# Patient Record
Sex: FEMALE | Race: WHITE | NOT HISPANIC OR LATINO | Employment: OTHER | URBAN - METROPOLITAN AREA
[De-identification: names, ages, dates, MRNs, and addresses within clinical notes are randomized per-mention and may not be internally consistent; named-entity substitution may affect disease eponyms.]

---

## 2024-07-29 ENCOUNTER — ANNUAL EXAM (OUTPATIENT)
Dept: OBGYN CLINIC | Facility: CLINIC | Age: 64
End: 2024-07-29
Payer: COMMERCIAL

## 2024-07-29 VITALS
DIASTOLIC BLOOD PRESSURE: 72 MMHG | HEIGHT: 62 IN | WEIGHT: 113 LBS | BODY MASS INDEX: 20.8 KG/M2 | SYSTOLIC BLOOD PRESSURE: 122 MMHG

## 2024-07-29 DIAGNOSIS — M79.672 LEFT FOOT PAIN: ICD-10-CM

## 2024-07-29 DIAGNOSIS — R10.2 PELVIC PAIN: ICD-10-CM

## 2024-07-29 DIAGNOSIS — Z01.419 WOMEN'S ANNUAL ROUTINE GYNECOLOGICAL EXAMINATION: Primary | ICD-10-CM

## 2024-07-29 DIAGNOSIS — Z12.31 ENCOUNTER FOR SCREENING MAMMOGRAM FOR MALIGNANT NEOPLASM OF BREAST: ICD-10-CM

## 2024-07-29 DIAGNOSIS — N89.8 VAGINAL DRYNESS: ICD-10-CM

## 2024-07-29 PROCEDURE — 99396 PREV VISIT EST AGE 40-64: CPT | Performed by: OBSTETRICS & GYNECOLOGY

## 2024-07-29 RX ORDER — ESTRADIOL 0.1 MG/G
CREAM VAGINAL
Qty: 42.5 G | Refills: 11 | Status: SHIPPED | OUTPATIENT
Start: 2024-07-29

## 2024-07-29 NOTE — PROGRESS NOTES
ASSESSMENT & PLAN:   Diagnoses and all orders for this visit:    Women's annual routine gynecological examination    Encounter for screening mammogram for malignant neoplasm of breast  -     Mammo screening bilateral w 3d & cad; Future    Left foot pain  -     Ambulatory Referral to Podiatry; Future    Vaginal dryness  -     estradiol (ESTRACE VAGINAL) 0.1 mg/g vaginal cream; Insert 2gm vaginally 3 x a week    Pelvic pain  -     US pelvis complete w transvaginal; Future          The following were reviewed in today's visit: ASCCP guidelines, Gardisil vaccination, STD testing breast self exam, mammography screening ordered, menopause, exercise, and healthy diet.    Patient to return to office in yearly for annual exam.     All questions have been answered to her satisfaction.        CC:  Annual Gynecologic Examination  Chief Complaint   Patient presents with    Gynecologic Exam     Pt presents for yearly exam.  PAP 22 WNL, HPV-   no records of mammo  PCP ordered DXA in   no records of colonoscopy         HPI: Winter Garnica is a 64 y.o.  who presents for annual gynecologic examination.  She has the following concerns:  pelvic and groin pain      Health Maintenance:    Exercise: daily  Breast exams/breast awareness: yes  Last mammogram: unknown  Colorectal cancer screening: none    Past Medical History:   Diagnosis Date    Abnormal Pap smear of cervix     HPV pos-had LEEP    Anxiety     High risk HPV infection     had LEEP       Past Surgical History:   Procedure Laterality Date    BREAST SURGERY Bilateral 2006    BREAST LIFT    CATARACT EXTRACTION Bilateral     CERVICAL BIOPSY  W/ LOOP ELECTRODE EXCISION      CHROMOSOME ANALYSIS, PRODUCTS OF CONCEPTION (HISTORICAL)      D+E    FACIAL RHYTIDECTOMY N/A 2022    Procedure: MINI FACELIFT;  Surgeon: Mason Lea MD;  Location:  MAIN OR;  Service: Plastics    INCISION AND DRAINAGE INTRA ORAL ABSCESS Right 2023    Procedure: INCISION  AND DRAINAGE  (I&D) WOUND ORAL; EXTRACTION TOOTH #31;  Surgeon: Deshawn Gregg DDS;  [facility];  Service: Maxillofacial    INDUCED       surgically    KNEE ARTHROSCOPY      therapeutic    SCALP EXCISION Right 2022    Procedure: EXCISION OF BONE CYST SCALP;  Surgeon: Mason Lea MD;  Location:  MAIN OR;  Service: Plastics    WRIST SURGERY         Past OB/Gyn History:   No LMP recorded. Patient is postmenopausal.    Menopausal status: postmenopausal  Menopausal symptoms: None    Last Pap:  : no abnormalities  History of abnormal Pap smear: yes    Patient is currently sexually active.   STD testing: no  Current contraception: post menopausal status      Family History  Family History   Problem Relation Age of Onset    Breast cancer Mother     Heart attack Father 44    Drug abuse Brother         drug addiction    Diabetes Maternal Grandmother     Cancer Maternal Grandmother         unsure what type    No Known Problems Maternal Grandfather     Dementia Paternal Grandmother     No Known Problems Paternal Grandfather     Lung cancer Maternal Uncle     Ulcers Paternal Aunt        Family history of uterine or ovarian cancer: no  Family history of breast cancer: no  Family history of colon cancer: no    Social History:  Social History     Socioeconomic History    Marital status: /Civil Union     Spouse name: Not on file    Number of children: Not on file    Years of education: Not on file    Highest education level: Not on file   Occupational History    Occupation: massage therapist   Tobacco Use    Smoking status: Former     Current packs/day: 0.00     Types: Cigarettes     Quit date:      Years since quittin.6    Smokeless tobacco: Former   Vaping Use    Vaping status: Never Used   Substance and Sexual Activity    Alcohol use: Yes     Alcohol/week: 2.0 standard drinks of alcohol     Types: 2 Glasses of wine per week     Comment: SOCIAL    Drug use: Yes     Types:  Marijuana     Comment: WHEN ANXIOUS    Sexual activity: Yes     Partners: Male     Birth control/protection: Post-menopausal   Other Topics Concern    Not on file   Social History Narrative    Caffeine use-coffee    Denied: history of H/O domestic violence    Single ( as per allscripts)     Social Determinants of Health     Financial Resource Strain: Not on file   Food Insecurity: Not on file   Transportation Needs: Not on file   Physical Activity: Not on file   Stress: Not on file   Social Connections: Not on file   Intimate Partner Violence: Not on file   Housing Stability: Not on file     Domestic violence screen: negative    Allergies:  Allergies   Allergen Reactions    Meperidine Nausea Only    Other Other (See Comments)     Some antibiotics cause nausea    Oxycodone-Acetaminophen Itching    Penicillins Vomiting       Medications:    Current Outpatient Medications:     Ascorbic Acid (VITAMIN C) 1000 MG tablet, Take 1,000 mg by mouth daily, Disp: , Rfl:     Creatine POWD, Use, Disp: , Rfl:     estradiol (ESTRACE VAGINAL) 0.1 mg/g vaginal cream, Insert 2gm vaginally 3 x a week, Disp: 42.5 g, Rfl: 11    LORazepam (ATIVAN) 1 mg tablet, Take 1 tablet (1 mg total) by mouth daily as needed for anxiety, Disp: 10 tablet, Rfl: 0    multivitamin (THERAGRAN) TABS, Take 1 tablet by mouth daily, Disp: , Rfl:     promethazine (PHENERGAN) 25 mg tablet, Take 1 tablet (25 mg total) by mouth every 6 (six) hours as needed for nausea or vomiting, Disp: 30 tablet, Rfl: 0    valACYclovir (VALTREX) 1,000 mg tablet, TAKE 2 TABLETS TWICE A DAY X 1 DAY, FOR FEVER BLISTERS, Disp: 8 tablet, Rfl: 3    Omega-3 Fatty Acids (FISH OIL PO), Take by mouth, Disp: , Rfl:     Review of Systems:  Review of Systems   Constitutional: Negative.    HENT: Negative.     Respiratory: Negative.     Cardiovascular: Negative.    Gastrointestinal: Negative.    Genitourinary:  Positive for dyspareunia and pelvic pain.   Neurological: Negative.   "  Psychiatric/Behavioral: Negative.           Physical Exam:  /72 (BP Location: Right arm, Patient Position: Sitting, Cuff Size: Standard)   Ht 5' 2\" (1.575 m)   Wt 51.3 kg (113 lb)   BMI 20.67 kg/m²    Physical Exam  Constitutional:       Appearance: Normal appearance.   Genitourinary:      Bladder and urethral meatus normal.      No lesions in the vagina.      Right Labia: No rash, tenderness, lesions, skin changes or Bartholin's cyst.     Left Labia: No tenderness, lesions, skin changes, Bartholin's cyst or rash.     No vaginal erythema, tenderness or bleeding.        Right Adnexa: full.     Right Adnexa: not tender and no mass present.     Left Adnexa: not tender, not full and no mass present.     Cervix is parous.      No cervical motion tenderness, discharge or lesion.      Uterus is not enlarged, fixed or tender.      No uterine mass detected.     Urethral meatus caruncle not present.     No urethral tenderness or mass present.   Breasts:     Right: No swelling, bleeding, inverted nipple, mass, nipple discharge, skin change or tenderness.      Left: No swelling, bleeding, inverted nipple, mass, nipple discharge, skin change or tenderness.   HENT:      Head: Normocephalic and atraumatic.   Eyes:      Extraocular Movements: Extraocular movements intact.      Conjunctiva/sclera: Conjunctivae normal.      Pupils: Pupils are equal, round, and reactive to light.   Cardiovascular:      Rate and Rhythm: Normal rate and regular rhythm.      Heart sounds: Normal heart sounds. No murmur heard.  Pulmonary:      Effort: Pulmonary effort is normal. No respiratory distress.      Breath sounds: Normal breath sounds. No wheezing or rales.   Abdominal:      General: There is no distension.      Palpations: Abdomen is soft.      Tenderness: There is no abdominal tenderness. There is no guarding.   Neurological:      General: No focal deficit present.      Mental Status: She is alert and oriented to person, place, and " time.   Skin:     General: Skin is warm and dry.   Psychiatric:         Mood and Affect: Mood normal.         Behavior: Behavior normal.   Vitals and nursing note reviewed.

## 2024-07-30 ENCOUNTER — TELEPHONE (OUTPATIENT)
Age: 64
End: 2024-07-30

## 2024-07-30 DIAGNOSIS — F41.9 ANXIETY: ICD-10-CM

## 2024-07-30 RX ORDER — LORAZEPAM 2 MG/1
2 TABLET ORAL 2 TIMES DAILY
Qty: 20 TABLET | Refills: 0 | Status: SHIPPED | OUTPATIENT
Start: 2024-07-30 | End: 2024-08-09

## 2024-07-31 ENCOUNTER — HOSPITAL ENCOUNTER (OUTPATIENT)
Dept: ULTRASOUND IMAGING | Facility: HOSPITAL | Age: 64
Discharge: HOME | End: 2024-07-31
Attending: OBSTETRICS & GYNECOLOGY
Payer: COMMERCIAL

## 2024-07-31 DIAGNOSIS — R10.2 PELVIC PAIN: ICD-10-CM

## 2024-07-31 PROCEDURE — 76856 US EXAM PELVIC COMPLETE: CPT

## 2024-08-02 NOTE — RESULT ENCOUNTER NOTE
Teo Max,  Your ultrasound appears very normal.  The uterus is a normal size with a very small fibroid.  This is a benign growth that does not need any follow up.  The right ovary is a normal size and the left could not be seen.  This is very common in menopause as the ovaries get smaller with time.  Have a nice weekend,  DrG

## 2024-10-28 ENCOUNTER — RA CDI HCC (OUTPATIENT)
Dept: OTHER | Facility: HOSPITAL | Age: 64
End: 2024-10-28

## 2024-10-28 NOTE — PROGRESS NOTES
HCC coding opportunities       Chart reviewed, no opportunity found: CHART REVIEWED, NO OPPORTUNITY FOUND        Patients Insurance        Commercial Insurance: [employer]

## 2024-11-04 ENCOUNTER — OFFICE VISIT (OUTPATIENT)
Dept: FAMILY MEDICINE CLINIC | Facility: CLINIC | Age: 64
End: 2024-11-04
Payer: COMMERCIAL

## 2024-11-04 VITALS
HEIGHT: 62 IN | HEART RATE: 56 BPM | SYSTOLIC BLOOD PRESSURE: 112 MMHG | WEIGHT: 117 LBS | BODY MASS INDEX: 21.53 KG/M2 | OXYGEN SATURATION: 99 % | RESPIRATION RATE: 16 BRPM | DIASTOLIC BLOOD PRESSURE: 78 MMHG

## 2024-11-04 DIAGNOSIS — E78.2 MIXED HYPERLIPIDEMIA: Primary | ICD-10-CM

## 2024-11-04 DIAGNOSIS — F43.10 PTSD (POST-TRAUMATIC STRESS DISORDER): ICD-10-CM

## 2024-11-04 DIAGNOSIS — F41.9 ANXIETY: ICD-10-CM

## 2024-11-04 DIAGNOSIS — E53.8 B12 DEFICIENCY: ICD-10-CM

## 2024-11-04 PROCEDURE — 99214 OFFICE O/P EST MOD 30 MIN: CPT | Performed by: FAMILY MEDICINE

## 2024-11-04 NOTE — PROGRESS NOTES
Ambulatory Visit  Name: Winter Garnica      : 1960      MRN: [id number]  Encounter Provider: Cj Bhat MD  Encounter Date: 2024   Encounter department: [facility]    Assessment & Plan  Mixed hyperlipidemia    Orders:  •  Lipid Panel with Direct LDL reflex    B12 deficiency    Orders:  •  Vitamin B12    PTSD (post-traumatic stress disorder)    Orders:  •  CBC and differential  •  Comprehensive metabolic panel  •  Lipid Panel with Direct LDL reflex  •  Magnesium  •  Vitamin B12  •  TSH, 3rd generation with Free T4 reflex    Anxiety    Orders:  •  CBC and differential  •  Comprehensive metabolic panel  •  Lipid Panel with Direct LDL reflex  •  Magnesium  •  Vitamin B12  •  TSH, 3rd generation with Free T4 reflex       Assessment & Plan  1. Memory loss.  Her memory loss could be attributed to long-term use of benzodiazepines, which are known to affect brain function. Additionally, high stress levels may be contributing to this issue. She is advised to write down names first to help with memory recall. A comprehensive blood work will be conducted, including a complete blood count (CBC), comprehensive metabolic panel (CMP), cholesterol levels, and magnesium levels.    2. Anxiety.  Her anxiety appears to be poorly controlled, contributing to her memory issues. She reports listening to affirmations and engaging in physical activity, which has been helpful. Continued use of these strategies is recommended.    3. Post-traumatic stress disorder (PTSD).  She experiences significant anxiety and distress during gynecological exams due to past trauma. No new treatment was discussed during this visit.    4. Recent illness.  She experienced a head cold with lingering mucus and cough, which her  suggested might have been a mild case of COVID-19. No further treatment is required as symptoms have resolved.        History of Present Illness     History of Present Illness  The patient is a  64-year-old female who presents for evaluation of multiple medical concerns.    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 She recalls similar incidents from 20 years ago and 7 years ago when she forgot a friend's name. She is concerned about the possibility of developing dementia. She has been taking a brain supplement and believes her anxiety may be contributing to her symptoms. She manages her anxiety through daily affirmations and regular exercise.    She also mentions experiencing post-traumatic stress disorder (PTSD) symptoms when visiting her gynecologist, which include shaking, crying, and knee weakness. She has been taking magnesium supplements.    Additionally, she has been dealing with a persistent cough and mucus production following a recent illness. She has had three previous COVID-19 infections, with the first one being particularly severe, causing nausea and immobility for 10 days. She was diagnosed with COVID-19 pneumonia at [facility].    Lastly, she mentions that her cholesterol levels have consistently been high, even when fasting. She is currently taking fish oil, vitamin C, and turmeric supplements.     Review of Systems   Constitutional:  Negative for fever and unexpected weight change.   HENT:  Negative for nosebleeds and trouble swallowing.    Eyes:  Negative for visual disturbance.   Respiratory:  Negative for chest tightness and shortness of breath.    Cardiovascular:  Negative for chest pain, palpitations and leg swelling.   Gastrointestinal:  Negative for abdominal pain, constipation, diarrhea and nausea.   Endocrine: Negative for cold intolerance.   Genitourinary:  Negative for dysuria and urgency.   Musculoskeletal:  Negative for joint swelling and myalgias.   Skin:  Negative for rash.   Neurological:  Negative for tremors, seizures and syncope.   Hematological:  Does not bruise/bleed easily.   Psychiatric/Behavioral:   "Negative for hallucinations and suicidal ideas.      Objective     /78   Pulse 56   Resp 16   Ht 5' 2\" (1.575 m)   Wt 53.1 kg (117 lb)   SpO2 99%   BMI 21.40 kg/m²     Physical Exam    Physical Exam  Vitals and nursing note reviewed.   Constitutional:       Appearance: She is well-developed.   HENT:      Head: Normocephalic and atraumatic.      Right Ear: External ear normal.      Left Ear: External ear normal.      Nose: Nose normal.   Eyes:      Conjunctiva/sclera: Conjunctivae normal.      Pupils: Pupils are equal, round, and reactive to light.   Cardiovascular:      Rate and Rhythm: Normal rate and regular rhythm.      Heart sounds: Normal heart sounds. No murmur heard.  Pulmonary:      Effort: Pulmonary effort is normal.      Breath sounds: Normal breath sounds. No wheezing.   Abdominal:      General: Bowel sounds are normal.      Palpations: Abdomen is soft.   Musculoskeletal:         General: No tenderness. Normal range of motion.      Cervical back: Normal range of motion and neck supple.   Lymphadenopathy:      Cervical: No cervical adenopathy.   Skin:     General: Skin is warm and dry.      Capillary Refill: Capillary refill takes less than 2 seconds.   Neurological:      Mental Status: She is alert and oriented to person, place, and time.   Psychiatric:         Behavior: Behavior normal.         Thought Content: Thought content normal.         Judgment: Judgment normal.       "

## 2024-11-04 NOTE — ASSESSMENT & PLAN NOTE
Orders:    CBC and differential    Comprehensive metabolic panel    Lipid Panel with Direct LDL reflex    Magnesium    Vitamin B12    TSH, 3rd generation with Free T4 reflex

## 2024-12-02 ENCOUNTER — RESULTS FOLLOW-UP (OUTPATIENT)
Dept: FAMILY MEDICINE CLINIC | Facility: CLINIC | Age: 64
End: 2024-12-02

## 2024-12-02 ENCOUNTER — APPOINTMENT (OUTPATIENT)
Dept: LAB | Facility: CLINIC | Age: 64
End: 2024-12-02
Payer: COMMERCIAL

## 2024-12-02 LAB
ALBUMIN SERPL BCG-MCNC: 4.2 G/DL (ref 3.5–5)
ALP SERPL-CCNC: 91 U/L (ref 34–104)
ALT SERPL W P-5'-P-CCNC: 46 U/L (ref 7–52)
ANION GAP SERPL CALCULATED.3IONS-SCNC: 6 MMOL/L (ref 4–13)
AST SERPL W P-5'-P-CCNC: 27 U/L (ref 13–39)
BASOPHILS # BLD AUTO: 0.05 THOUSANDS/ΜL (ref 0–0.1)
BASOPHILS NFR BLD AUTO: 1 % (ref 0–1)
BILIRUB SERPL-MCNC: 0.8 MG/DL (ref 0.2–1)
BUN SERPL-MCNC: 21 MG/DL (ref 5–25)
CALCIUM SERPL-MCNC: 9.5 MG/DL (ref 8.4–10.2)
CHLORIDE SERPL-SCNC: 102 MMOL/L (ref 96–108)
CHOLEST SERPL-MCNC: 210 MG/DL (ref ?–200)
CO2 SERPL-SCNC: 33 MMOL/L (ref 21–32)
CREAT SERPL-MCNC: 0.97 MG/DL (ref 0.6–1.3)
EOSINOPHIL # BLD AUTO: 0.32 THOUSAND/ΜL (ref 0–0.61)
EOSINOPHIL NFR BLD AUTO: 4 % (ref 0–6)
ERYTHROCYTE [DISTWIDTH] IN BLOOD BY AUTOMATED COUNT: 13.1 % (ref 11.6–15.1)
GFR SERPL CREATININE-BSD FRML MDRD: 62 ML/MIN/1.73SQ M
GLUCOSE P FAST SERPL-MCNC: 106 MG/DL (ref 65–99)
HCT VFR BLD AUTO: 47.5 % (ref 34.8–46.1)
HDLC SERPL-MCNC: 75 MG/DL
HGB BLD-MCNC: 15.2 G/DL (ref 11.5–15.4)
IMM GRANULOCYTES # BLD AUTO: 0.01 THOUSAND/UL (ref 0–0.2)
IMM GRANULOCYTES NFR BLD AUTO: 0 % (ref 0–2)
LDLC SERPL CALC-MCNC: 120 MG/DL (ref 0–100)
LYMPHOCYTES # BLD AUTO: 3.5 THOUSANDS/ΜL (ref 0.6–4.47)
LYMPHOCYTES NFR BLD AUTO: 42 % (ref 14–44)
MAGNESIUM SERPL-MCNC: 2.1 MG/DL (ref 1.9–2.7)
MCH RBC QN AUTO: 31.9 PG (ref 26.8–34.3)
MCHC RBC AUTO-ENTMCNC: 32 G/DL (ref 31.4–37.4)
MCV RBC AUTO: 100 FL (ref 82–98)
MONOCYTES # BLD AUTO: 0.62 THOUSAND/ΜL (ref 0.17–1.22)
MONOCYTES NFR BLD AUTO: 8 % (ref 4–12)
NEUTROPHILS # BLD AUTO: 3.78 THOUSANDS/ΜL (ref 1.85–7.62)
NEUTS SEG NFR BLD AUTO: 45 % (ref 43–75)
NRBC BLD AUTO-RTO: 0 /100 WBCS
PLATELET # BLD AUTO: 304 THOUSANDS/UL (ref 149–390)
PMV BLD AUTO: 10.3 FL (ref 8.9–12.7)
POTASSIUM SERPL-SCNC: 4.5 MMOL/L (ref 3.5–5.3)
PROT SERPL-MCNC: 6.9 G/DL (ref 6.4–8.4)
RBC # BLD AUTO: 4.77 MILLION/UL (ref 3.81–5.12)
SODIUM SERPL-SCNC: 141 MMOL/L (ref 135–147)
TRIGL SERPL-MCNC: 77 MG/DL (ref ?–150)
TSH SERPL DL<=0.05 MIU/L-ACNC: 3.16 UIU/ML (ref 0.45–4.5)
VIT B12 SERPL-MCNC: 578 PG/ML (ref 180–914)
WBC # BLD AUTO: 8.28 THOUSAND/UL (ref 4.31–10.16)

## 2024-12-09 ENCOUNTER — TELEPHONE (OUTPATIENT)
Age: 64
End: 2024-12-09

## 2024-12-09 DIAGNOSIS — B00.1 HERPES LABIALIS: ICD-10-CM

## 2024-12-09 RX ORDER — VALACYCLOVIR HYDROCHLORIDE 1 G/1
1000 TABLET, FILM COATED ORAL 2 TIMES DAILY
Qty: 8 TABLET | Refills: 0 | Status: CANCELLED | OUTPATIENT
Start: 2024-12-09 | End: 2025-01-09

## 2024-12-09 RX ORDER — VALACYCLOVIR HYDROCHLORIDE 1 G/1
TABLET, FILM COATED ORAL
Qty: 8 TABLET | Refills: 3 | Status: SHIPPED | OUTPATIENT
Start: 2024-12-09 | End: 2025-10-31

## 2024-12-09 NOTE — TELEPHONE ENCOUNTER
Winter is on vacation in [place] and feels a fever blister starting.  She would like to have valACYclovir (VALTREX) 1,000 mg tablet, which has been prescribed previously called into the [facility] below. Please expedite. Thank you.    [facility]  [address], [place] [address]  Phone [phone]

## 2024-12-09 NOTE — TELEPHONE ENCOUNTER
Reason for call:   [x] Refill   [] Prior Auth  [x] Other: Pt called to f/u on refill request. Pt states she is on vacation in [place] and she needs this med asap.    Office:   [x] PCP/Provider - Cj Bhat MD   [] Specialty/Provider -     Medication: (VALTREX) 1,000 mg     Dose/Frequency: 2 tabs BID    Quantity: 8 tabs    Pharmacy: [facility]/pharmacy #[id number] [place] [id number]      Does the patient have enough for 3 days?   [] Yes   [x] No - Send as HP to POD

## 2025-01-08 ENCOUNTER — TELEPHONE (OUTPATIENT)
Age: 65
End: 2025-01-08

## 2025-01-08 NOTE — TELEPHONE ENCOUNTER
Patient called in stating she would like to know if Dr. Bhat could order some lab work for her to have done to check her Testosterone levels. Winter stated she has been working out almost every day since she was in her 20s and feels as though she is lacking somewhere as far as muscle etc. Winter stated her  had mentioned to speak her Primary Care Provider in-regards to lab works to see if she would qualify for type of TRT therapy. Please advise if orders can be done and if a follow-up appointment to discuss further is needed. Thank you.

## 2025-03-19 ENCOUNTER — TELEPHONE (OUTPATIENT)
Age: 65
End: 2025-03-19

## 2025-03-19 DIAGNOSIS — N89.8 VAGINAL DRYNESS: ICD-10-CM

## 2025-03-19 RX ORDER — ESTRADIOL 0.1 MG/G
CREAM VAGINAL
Qty: 42.5 G | Refills: 11 | Status: CANCELLED | OUTPATIENT
Start: 2025-03-19

## 2025-03-19 NOTE — TELEPHONE ENCOUNTER
Called patient to review questions. Patient is interested in discussing hormone replacement therapy. She states her insurance will not cover this until July. Patient is scheduled for next available appointment 11/17. Advised will review with provider. Patient verbalized understanding and is thankful.

## 2025-03-19 NOTE — TELEPHONE ENCOUNTER
Medication: estradiol (ESTRACE VAGINAL) 0.1 mg/g vaginal cream     Dose/Frequency:  Insert 2gm vaginally 3 x a week,     Quantity: 42.5    Pharmacy:  [facility] - [facility] - [address] [place] [address].     Office:   [] PCP/Provider -   [x] Speciality/Provider -     Does the patient have enough for 3 days?   [] Yes   [x] No - Send as HP to POD

## 2025-03-20 DIAGNOSIS — N89.8 VAGINAL DRYNESS: ICD-10-CM

## 2025-03-20 RX ORDER — ESTRADIOL 0.1 MG/G
CREAM VAGINAL
Qty: 42.5 G | Refills: 11 | Status: SHIPPED | OUTPATIENT
Start: 2025-03-20

## 2025-05-02 ENCOUNTER — TELEPHONE (OUTPATIENT)
Age: 65
End: 2025-05-02

## 2025-05-02 DIAGNOSIS — F41.9 ANXIETY: ICD-10-CM

## 2025-05-02 RX ORDER — LORAZEPAM 2 MG/1
2 TABLET ORAL 2 TIMES DAILY
Qty: 20 TABLET | Refills: 0 | Status: SHIPPED | OUTPATIENT
Start: 2025-05-02 | End: 2025-05-12

## 2025-05-02 RX ORDER — PROMETHAZINE HYDROCHLORIDE 25 MG/1
25 TABLET ORAL EVERY 6 HOURS PRN
Qty: 30 TABLET | Refills: 0 | Status: SHIPPED | OUTPATIENT
Start: 2025-05-02

## 2025-05-02 NOTE — TELEPHONE ENCOUNTER
Patient had to reschedule an appointment she had for next Tuesday, May 6 for a physical. She can only come in on Wednesdays. Nothing available until July 30 and she has been rescheduled. She would like to come in before May 20th. She is going on vacation and wanted to get some medications filled for her trip. She will be back on May 28.    Medications she needs are: LORazepam (ATIVAN) 2 mg tablet and a medication for nausea that comes with the anxiety (she did not recall the name). She only needs the medication occasionally and it hasn't been refilled since 2024.     If patient can be accommodated with a Wednesday appointment (preferably morning) before May 20, please schedule. If a short supply of medication can be given for her trip, please assist.

## 2025-08-22 ENCOUNTER — HOSPITAL ENCOUNTER (OUTPATIENT)
Facility: HOSPITAL | Age: 65
Discharge: HOME | End: 2025-08-22
Attending: FAMILY MEDICINE
Payer: COMMERCIAL

## 2025-08-22 VITALS — BODY MASS INDEX: 21.53 KG/M2 | WEIGHT: 117 LBS | HEIGHT: 62 IN

## 2025-08-22 DIAGNOSIS — Z13.820 ENCOUNTER FOR SCREENING FOR OSTEOPOROSIS: ICD-10-CM

## 2025-08-22 PROCEDURE — 77080 DXA BONE DENSITY AXIAL: CPT
